# Patient Record
Sex: MALE | Race: WHITE | Employment: FULL TIME | URBAN - METROPOLITAN AREA
[De-identification: names, ages, dates, MRNs, and addresses within clinical notes are randomized per-mention and may not be internally consistent; named-entity substitution may affect disease eponyms.]

---

## 2017-05-15 ENCOUNTER — OFFICE VISIT (OUTPATIENT)
Dept: OTHER | Age: 52
End: 2017-05-15
Attending: PREVENTIVE MEDICINE

## 2017-05-15 DIAGNOSIS — Z00.00 PHYSICAL EXAM: Primary | ICD-10-CM

## 2017-05-15 PROCEDURE — 87591 N.GONORRHOEAE DNA AMP PROB: CPT

## 2017-05-18 ENCOUNTER — APPOINTMENT (OUTPATIENT)
Dept: OTHER | Age: 52
End: 2017-05-18
Attending: PREVENTIVE MEDICINE

## 2019-07-20 PROCEDURE — 86803 HEPATITIS C AB TEST: CPT | Performed by: INTERNAL MEDICINE

## 2019-08-28 PROCEDURE — 88305 TISSUE EXAM BY PATHOLOGIST: CPT | Performed by: INTERNAL MEDICINE

## 2020-05-26 PROBLEM — R39.16 URINARY STRAINING: Status: ACTIVE | Noted: 2020-05-26

## 2020-05-26 PROBLEM — R39.15 URINARY URGENCY: Status: ACTIVE | Noted: 2020-05-26

## 2020-05-26 PROBLEM — R35.1 NOCTURIA: Status: ACTIVE | Noted: 2020-05-26

## 2020-05-26 PROBLEM — R35.0 URINARY FREQUENCY: Status: ACTIVE | Noted: 2020-05-26

## 2020-05-26 PROBLEM — N39.43 POST-VOID DRIBBLING: Status: ACTIVE | Noted: 2020-05-26

## 2020-06-13 PROBLEM — N40.1 BPH WITH OBSTRUCTION/LOWER URINARY TRACT SYMPTOMS: Status: ACTIVE | Noted: 2020-06-13

## 2020-06-13 PROBLEM — N13.8 BPH WITH OBSTRUCTION/LOWER URINARY TRACT SYMPTOMS: Status: ACTIVE | Noted: 2020-06-13

## 2020-07-02 ENCOUNTER — APPOINTMENT (OUTPATIENT)
Dept: LAB | Age: 55
End: 2020-07-02
Attending: PHYSICIAN ASSISTANT
Payer: COMMERCIAL

## 2020-07-02 DIAGNOSIS — D48.5 NEOPLASM OF UNCERTAIN BEHAVIOR OF SKIN: ICD-10-CM

## 2020-07-02 PROCEDURE — 88342 IMHCHEM/IMCYTCHM 1ST ANTB: CPT

## 2020-07-17 PROCEDURE — 88321 CONSLTJ&REPRT SLD PREP ELSWR: CPT | Performed by: UROLOGY

## 2020-07-17 PROCEDURE — 88342 IMHCHEM/IMCYTCHM 1ST ANTB: CPT | Performed by: UROLOGY

## 2020-07-17 PROCEDURE — 88305 TISSUE EXAM BY PATHOLOGIST: CPT | Performed by: UROLOGY

## 2020-07-17 PROCEDURE — 88341 IMHCHEM/IMCYTCHM EA ADD ANTB: CPT | Performed by: UROLOGY

## 2020-09-10 PROBLEM — R30.0 DYSURIA: Status: ACTIVE | Noted: 2020-09-10

## 2022-02-22 PROBLEM — E78.00 HYPERCHOLESTEREMIA: Status: ACTIVE | Noted: 2022-02-22

## 2023-02-13 ENCOUNTER — APPOINTMENT (OUTPATIENT)
Dept: CT IMAGING | Facility: HOSPITAL | Age: 58
End: 2023-02-13
Attending: EMERGENCY MEDICINE
Payer: COMMERCIAL

## 2023-02-13 ENCOUNTER — APPOINTMENT (OUTPATIENT)
Dept: GENERAL RADIOLOGY | Facility: HOSPITAL | Age: 58
End: 2023-02-13
Attending: EMERGENCY MEDICINE
Payer: COMMERCIAL

## 2023-02-13 ENCOUNTER — HOSPITAL ENCOUNTER (EMERGENCY)
Facility: HOSPITAL | Age: 58
Discharge: HOME | End: 2023-02-13
Attending: EMERGENCY MEDICINE
Payer: COMMERCIAL

## 2023-02-13 VITALS
SYSTOLIC BLOOD PRESSURE: 150 MMHG | TEMPERATURE: 99 F | RESPIRATION RATE: 16 BRPM | HEART RATE: 83 BPM | OXYGEN SATURATION: 94 % | BODY MASS INDEX: 25 KG/M2 | WEIGHT: 180.75 LBS | DIASTOLIC BLOOD PRESSURE: 84 MMHG

## 2023-02-13 DIAGNOSIS — S80.01XA CONTUSION OF RIGHT KNEE, INITIAL ENCOUNTER: ICD-10-CM

## 2023-02-13 DIAGNOSIS — S00.03XA HEMATOMA OF SCALP, INITIAL ENCOUNTER: ICD-10-CM
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
## 2023-02-13 DIAGNOSIS — N17.9 AKI (ACUTE KIDNEY INJURY) (HCC): ICD-10-CM

## 2023-02-13 DIAGNOSIS — S09.90XA INJURY OF HEAD, INITIAL ENCOUNTER: ICD-10-CM

## 2023-02-13 DIAGNOSIS — S80.11XA CONTUSION OF RIGHT LOWER EXTREMITY, INITIAL ENCOUNTER: ICD-10-CM

## 2023-02-13 LAB
ALBUMIN SERPL-MCNC: 3.7 G/DL (ref 3.4–5)
ALBUMIN/GLOB SERPL: 1.3 {RATIO} (ref 1–2)
ALP LIVER SERPL-CCNC: 67 U/L
ALT SERPL-CCNC: 24 U/L
AMPHET UR QL SCN: NEGATIVE
ANION GAP SERPL CALC-SCNC: 4 MMOL/L (ref 0–18)
APTT PPP: 28.3 SECONDS (ref 23.3–35.6)
AST SERPL-CCNC: 20 U/L (ref 15–37)
BASOPHILS # BLD AUTO: 0.03 X10(3) UL (ref 0–0.2)
BASOPHILS NFR BLD AUTO: 0.3 %
BENZODIAZ UR QL SCN: NEGATIVE
BILIRUB SERPL-MCNC: 0.2 MG/DL (ref 0.1–2)
BUN BLD-MCNC: 18 MG/DL (ref 7–18)
CALCIUM BLD-MCNC: 8.8 MG/DL (ref 8.5–10.1)
CANNABINOIDS UR QL SCN: NEGATIVE
CHLORIDE SERPL-SCNC: 112 MMOL/L (ref 98–112)
CO2 SERPL-SCNC: 25 MMOL/L (ref 21–32)
COCAINE UR QL: NEGATIVE
CREAT BLD-MCNC: 1.86 MG/DL
CREAT UR-SCNC: 169 MG/DL
EOSINOPHIL # BLD AUTO: 0.19 X10(3) UL (ref 0–0.7)
EOSINOPHIL NFR BLD AUTO: 2.2 %
ERYTHROCYTE [DISTWIDTH] IN BLOOD BY AUTOMATED COUNT: 12.5 %
ETHANOL SERPL-MCNC: <3 MG/DL (ref ?–3)
GFR SERPLBLD BASED ON 1.73 SQ M-ARVRAT: 41 ML/MIN/1.73M2 (ref 60–?)
GLOBULIN PLAS-MCNC: 2.9 G/DL (ref 2.8–4.4)
GLUCOSE BLD-MCNC: 135 MG/DL (ref 70–99)
HCT VFR BLD AUTO: 43.3 %
HGB BLD-MCNC: 15.2 G/DL
IMM GRANULOCYTES # BLD AUTO: 0.08 X10(3) UL (ref 0–1)
IMM GRANULOCYTES NFR BLD: 0.9 %
INR BLD: 1.01 (ref 0.85–1.16)
LYMPHOCYTES # BLD AUTO: 3.26 X10(3) UL (ref 1–4)
LYMPHOCYTES NFR BLD AUTO: 37.4 %
MCH RBC QN AUTO: 31 PG (ref 26–34)
MCHC RBC AUTO-ENTMCNC: 35.1 G/DL (ref 31–37)
MCV RBC AUTO: 88.2 FL
MDMA UR QL SCN: NEGATIVE
MONOCYTES # BLD AUTO: 0.53 X10(3) UL (ref 0.1–1)
MONOCYTES NFR BLD AUTO: 6.1 %
NEUTROPHILS # BLD AUTO: 4.63 X10 (3) UL (ref 1.5–7.7)
NEUTROPHILS # BLD AUTO: 4.63 X10(3) UL (ref 1.5–7.7)
NEUTROPHILS NFR BLD AUTO: 53.1 %
OPIATES UR QL SCN: NEGATIVE
OSMOLALITY SERPL CALC.SUM OF ELEC: 296 MOSM/KG (ref 275–295)
OXYCODONE UR QL SCN: NEGATIVE
PLATELET # BLD AUTO: 238 10(3)UL (ref 150–450)
POTASSIUM SERPL-SCNC: 3.8 MMOL/L (ref 3.5–5.1)
PROT SERPL-MCNC: 6.6 G/DL (ref 6.4–8.2)
PROTHROMBIN TIME: 13.3 SECONDS (ref 11.6–14.8)
RBC # BLD AUTO: 4.91 X10(6)UL
SODIUM SERPL-SCNC: 141 MMOL/L (ref 136–145)
TROPONIN I HIGH SENSITIVITY: 10 NG/L
WBC # BLD AUTO: 8.7 X10(3) UL (ref 4–11)

## 2023-02-13 PROCEDURE — 99285 EMERGENCY DEPT VISIT HI MDM: CPT

## 2023-02-13 PROCEDURE — 96360 HYDRATION IV INFUSION INIT: CPT

## 2023-02-13 PROCEDURE — 80307 DRUG TEST PRSMV CHEM ANLYZR: CPT | Performed by: EMERGENCY MEDICINE

## 2023-02-13 PROCEDURE — 72125 CT NECK SPINE W/O DYE: CPT | Performed by: EMERGENCY MEDICINE

## 2023-02-13 PROCEDURE — 85610 PROTHROMBIN TIME: CPT | Performed by: EMERGENCY MEDICINE

## 2023-02-13 PROCEDURE — 80053 COMPREHEN METABOLIC PANEL: CPT | Performed by: EMERGENCY MEDICINE

## 2023-02-13 PROCEDURE — 96361 HYDRATE IV INFUSION ADD-ON: CPT

## 2023-02-13 PROCEDURE — 85730 THROMBOPLASTIN TIME PARTIAL: CPT | Performed by: EMERGENCY MEDICINE

## 2023-02-13 PROCEDURE — 84484 ASSAY OF TROPONIN QUANT: CPT | Performed by: EMERGENCY MEDICINE

## 2023-02-13 PROCEDURE — 82077 ASSAY SPEC XCP UR&BREATH IA: CPT | Performed by: EMERGENCY MEDICINE

## 2023-02-13 PROCEDURE — 70450 CT HEAD/BRAIN W/O DYE: CPT | Performed by: EMERGENCY MEDICINE

## 2023-02-13 PROCEDURE — 71045 X-RAY EXAM CHEST 1 VIEW: CPT | Performed by: EMERGENCY MEDICINE

## 2023-02-13 PROCEDURE — 85025 COMPLETE CBC W/AUTO DIFF WBC: CPT | Performed by: EMERGENCY MEDICINE
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
## 2023-02-17 ENCOUNTER — HOSPITAL ENCOUNTER (OUTPATIENT)
Dept: CT IMAGING | Facility: HOSPITAL | Age: 58
Discharge: HOME | End: 2023-02-17
Attending: Other
Payer: COMMERCIAL

## 2023-02-17 ENCOUNTER — OFFICE VISIT (OUTPATIENT)
Dept: NEUROLOGY | Facility: CLINIC | Age: 58
End: 2023-02-17

## 2023-02-17 VITALS
RESPIRATION RATE: 16 BRPM | DIASTOLIC BLOOD PRESSURE: 68 MMHG | SYSTOLIC BLOOD PRESSURE: 122 MMHG | BODY MASS INDEX: 28 KG/M2 | HEART RATE: 90 BPM | WEIGHT: 200.19 LBS
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
## 2023-02-17 DIAGNOSIS — R42 VERTIGO: ICD-10-CM

## 2023-02-17 DIAGNOSIS — S06.0X1A CONCUSSION WITH LOSS OF CONSCIOUSNESS OF 30 MINUTES OR LESS, INITIAL ENCOUNTER: ICD-10-CM

## 2023-02-17 DIAGNOSIS — S06.0X1A CONCUSSION WITH LOSS OF CONSCIOUSNESS OF 30 MINUTES OR LESS, INITIAL ENCOUNTER: Primary | ICD-10-CM

## 2023-02-17 DIAGNOSIS — H53.8 BLURRY VISION, LEFT EYE: ICD-10-CM

## 2023-02-17 DIAGNOSIS — S09.90XA INJURY OF HEAD, INITIAL ENCOUNTER: ICD-10-CM

## 2023-02-17 DIAGNOSIS — S05.12XA ECCHYMOSIS OF LEFT EYE, INITIAL ENCOUNTER: ICD-10-CM

## 2023-02-17 PROCEDURE — 70450 CT HEAD/BRAIN W/O DYE: CPT | Performed by: OTHER
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
## 2023-02-20 ENCOUNTER — TELEPHONE (OUTPATIENT)
Dept: SURGERY | Facility: CLINIC | Age: 58
End: 2023-02-20

## 2023-02-20 NOTE — TELEPHONE ENCOUNTER
Pt has questions regarding disability paperwork that the insurance company is requesting. Please advise.

## 2023-02-20 NOTE — TELEPHONE ENCOUNTER
Spoke to patient and he states that he needs papers filled out for him to be off work last week and this week. Advised this paperwork will need to be from his employers. Patient states this is for [employer] not his employer. Patient then stated he needs to be off for all of the time for testing. Advised patient if he needs paperwork completed please reach out to his employer for this paperwork and drop off at the office.

## 2023-02-24 ENCOUNTER — HOSPITAL ENCOUNTER (OUTPATIENT)
Dept: MRI IMAGING | Facility: HOSPITAL | Age: 58
Discharge: HOME | End: 2023-02-24
Attending: Other
Payer: COMMERCIAL

## 2023-02-24 DIAGNOSIS — S09.90XA INJURY OF HEAD, INITIAL ENCOUNTER: ICD-10-CM

## 2023-02-24 DIAGNOSIS — S05.12XA ECCHYMOSIS OF LEFT EYE, INITIAL ENCOUNTER: ICD-10-CM

## 2023-02-24 DIAGNOSIS — H53.8 BLURRY VISION, LEFT EYE: ICD-10-CM

## 2023-02-24 PROCEDURE — 70544 MR ANGIOGRAPHY HEAD W/O DYE: CPT | Performed by: OTHER

## 2023-02-28 ENCOUNTER — TELEPHONE (OUTPATIENT)
Dept: PHYSICAL THERAPY | Facility: HOSPITAL | Age: 58
End: 2023-02-28

## 2023-02-28 ENCOUNTER — OFFICE VISIT (OUTPATIENT)
Dept: OCCUPATIONAL MEDICINE | Facility: HOSPITAL | Age: 58
End: 2023-02-28
Attending: Other
Payer: COMMERCIAL

## 2023-02-28 DIAGNOSIS — S06.0X1A CONCUSSION WITH LOSS OF CONSCIOUSNESS OF 30 MINUTES OR LESS, INITIAL ENCOUNTER: ICD-10-CM

## 2023-02-28 PROCEDURE — 97166 OT EVAL MOD COMPLEX 45 MIN: CPT

## 2023-02-28 PROCEDURE — 97530 THERAPEUTIC ACTIVITIES: CPT

## 2023-03-10 ENCOUNTER — OFFICE VISIT (OUTPATIENT)
Dept: OCCUPATIONAL MEDICINE | Facility: HOSPITAL | Age: 58
End: 2023-03-10
Attending: Other
Payer: COMMERCIAL

## 2023-03-10 PROCEDURE — 97530 THERAPEUTIC ACTIVITIES: CPT

## 2023-03-17 ENCOUNTER — OFFICE VISIT (OUTPATIENT)
Dept: OCCUPATIONAL MEDICINE | Facility: HOSPITAL | Age: 58
End: 2023-03-17
Attending: Other
Payer: COMMERCIAL

## 2023-03-17 PROCEDURE — 97530 THERAPEUTIC ACTIVITIES: CPT

## 2023-03-24 ENCOUNTER — OFFICE VISIT (OUTPATIENT)
Dept: OCCUPATIONAL MEDICINE | Facility: HOSPITAL | Age: 58
End: 2023-03-24
Attending: Other
Payer: COMMERCIAL

## 2023-03-24 PROCEDURE — 97530 THERAPEUTIC ACTIVITIES: CPT

## 2023-03-27 DIAGNOSIS — S06.0X1A CONCUSSION WITH LOSS OF CONSCIOUSNESS OF 30 MINUTES OR LESS, INITIAL ENCOUNTER: Primary | ICD-10-CM

## 2023-03-27 DIAGNOSIS — R42 VERTIGO: ICD-10-CM

## 2023-03-31 ENCOUNTER — OFFICE VISIT (OUTPATIENT)
Dept: OCCUPATIONAL MEDICINE | Facility: HOSPITAL | Age: 58
End: 2023-03-31
Attending: Other
Payer: COMMERCIAL

## 2023-03-31 PROCEDURE — 97530 THERAPEUTIC ACTIVITIES: CPT

## 2023-04-07 ENCOUNTER — APPOINTMENT (OUTPATIENT)
Dept: OCCUPATIONAL MEDICINE | Facility: HOSPITAL | Age: 58
End: 2023-04-07
Attending: Other
Payer: COMMERCIAL

## 2023-04-14 ENCOUNTER — OFFICE VISIT (OUTPATIENT)
Dept: OCCUPATIONAL MEDICINE | Facility: HOSPITAL | Age: 58
End: 2023-04-14
Attending: Other
Payer: COMMERCIAL

## 2023-04-14 PROCEDURE — 97530 THERAPEUTIC ACTIVITIES: CPT

## 2023-05-26 ENCOUNTER — LAB ENCOUNTER (OUTPATIENT)
Dept: LAB | Facility: HOSPITAL | Age: 58
End: 2023-05-26
Attending: INTERNAL MEDICINE
Payer: COMMERCIAL

## 2023-05-26 DIAGNOSIS — Z87.11 HISTORY OF PEPTIC ULCER DISEASE: ICD-10-CM

## 2023-05-26 PROCEDURE — 83013 H PYLORI (C-13) BREATH: CPT

## 2023-05-28 LAB — H PYLORI BREATH TEST: NEGATIVE
